# Patient Record
Sex: FEMALE | URBAN - METROPOLITAN AREA
[De-identification: names, ages, dates, MRNs, and addresses within clinical notes are randomized per-mention and may not be internally consistent; named-entity substitution may affect disease eponyms.]

---

## 2018-04-23 ENCOUNTER — APPOINTMENT (RX ONLY)
Dept: URBAN - METROPOLITAN AREA CLINIC 23 | Facility: CLINIC | Age: 58
Setting detail: DERMATOLOGY
End: 2018-04-23

## 2018-04-23 DIAGNOSIS — Z41.9 ENCOUNTER FOR PROCEDURE FOR PURPOSES OTHER THAN REMEDYING HEALTH STATE, UNSPECIFIED: ICD-10-CM

## 2018-04-23 PROCEDURE — ? FILLERS

## 2018-04-23 PROCEDURE — ? DYSPORT

## 2018-04-23 PROCEDURE — ? RECOMMENDATIONS

## 2018-04-23 NOTE — PROCEDURE: DYSPORT
Periorbital Skin Units: 0
Consent: Written consent obtained. Risks include but not limited to lid/brow ptosis, bruising, swelling, diplopia, temporary effect, incomplete chemical denervation.
Additional Area 3 Location: above left lateral brow
Lot #: [id number]
Detail Level: Zone
Additional Area 1 Location: forehead 2 cm above brows
Additional Area 1 Units: [facility]
Additional Area 5 Location: neck bands
Additional Area 6 Location: neck
Post-Care Instructions: Patient instructed to not lie down for 4 hours and limit physical activity for 24 hours. Patient instructed not to travel by airplane for 48 hours.
Additional Area 2 Location: high forehead 2 cm above brows
Dilution (U/ 0.1cc): 1.5
Glabellar Complex Units: 60
Expiration Date (Month Year): 09/30/2018
Price (Use Numbers Only, No Special Characters Or $): 0.00

## 2018-04-23 NOTE — PROCEDURE: FILLERS
Additional Area 3 Volume In Cc: 0
Additional Area 1 Location: using the cannula to bilateral cheeks. With conventional needle to lateral jawline.
Detail Level: Zone
Additional Area 1 Volume In Cc: 1
Anesthesia Type: 1% lidocaine without epinephrine
Map Statment: See [address] for Complete Details
Filler: Restylane-L
Filler: Boyd Shaffer
Anesthesia Volume In Cc: 0.2
Use Map Statement For Sites (Optional): No
Expiration Date (Month Year): 09/2020
Additional Anesthesia Volume In Cc: 6
Post-Care Instructions: Patient instructed to apply ice to reduce swelling.
Price (Use Numbers Only, No Special Characters Or $): 0.00
Topical Anesthesia?: BLT cream (benzocaine 20%, lidocaine 6%, tetracaine 4%)
Lot #: [id number]
Consent: Written consent obtained. Risks include but not limited to bruising, beading, irregular texture, ulceration, infection, allergic reaction, scar formation, incomplete augmentation, temporary nature, procedural pain.
Expiration Date (Month Year): 08/31/2020
Additional Area 1 Location: diluted with 0.2 cc plain lidocaine to perioral fine lines, lateral mouth, marionette lines
Lot #: [id number]

## 2018-04-23 NOTE — PROCEDURE: RECOMMENDATIONS
Detail Level: Zone
Recommendation Preamble: The following recommendations were made during the visit:
Recommendations (Free Text): 2 areas of Dysport; glabella nad eyes, and Restylane x 2 ml for perioral area, cheeks and under eyes.

## 2018-05-01 ENCOUNTER — APPOINTMENT (RX ONLY)
Dept: URBAN - METROPOLITAN AREA CLINIC 23 | Facility: CLINIC | Age: 58
Setting detail: DERMATOLOGY
End: 2018-05-01

## 2018-05-01 DIAGNOSIS — Z41.9 ENCOUNTER FOR PROCEDURE FOR PURPOSES OTHER THAN REMEDYING HEALTH STATE, UNSPECIFIED: ICD-10-CM

## 2018-05-01 PROCEDURE — ? FILLERS

## 2018-05-01 PROCEDURE — ? DYSPORT

## 2018-05-01 NOTE — HPI: COSMETIC FOLLOW UP
How Did You Tolerate The Procedure?: well, without problems
What Condition Are We Treating?: Fillers,patient states she needs more
What Procedure Did We Perform At The Last Visit?: Restylane Lyft and Regular Restylane

## 2018-05-01 NOTE — PROCEDURE: FILLERS
Additional Area 3 Volume In Cc: 0
Anesthesia Type: 1% lidocaine without epinephrine
Expiration Date (Month Year): 09/30/2020
Consent: Written consent obtained. Risks include but not limited to bruising, beading, irregular texture, ulceration, infection, allergic reaction, scar formation, incomplete augmentation, temporary nature, procedural pain.
Map Statment: See [address] for Complete Details
Include Cannula Information In Note?: No
Lot #: [id number]
Additional Anesthesia Volume In Cc: 6
Anesthesia Volume In Cc: 0.2
Price (Use Numbers Only, No Special Characters Or $): 0.00
Detail Level: Zone
Additional Area 1 Volume In Cc: 1
Lot #: [id number]
Additional Area 1 Location: perioral fine lines, marionette lines
Post-Care Instructions: Patient instructed to apply ice to reduce swelling.
Expiration Date (Month Year): 08/2019
Filler: Restylane-L
Additional Area 1 Location: perioral fine lines, lateral mouth, marionette lines

## 2020-09-08 ENCOUNTER — APPOINTMENT (RX ONLY)
Dept: URBAN - METROPOLITAN AREA CLINIC 23 | Facility: CLINIC | Age: 60
Setting detail: DERMATOLOGY
End: 2020-09-08

## 2020-09-08 DIAGNOSIS — Z41.9 ENCOUNTER FOR PROCEDURE FOR PURPOSES OTHER THAN REMEDYING HEALTH STATE, UNSPECIFIED: ICD-10-CM

## 2020-09-08 PROCEDURE — ? RECOMMENDATIONS

## 2020-09-08 PROCEDURE — ? FILLERS

## 2020-09-08 NOTE — PROCEDURE: RECOMMENDATIONS
Recommendations (Free Text): Geraldine Rainey / E-matrix
Detail Level: Detailed
Recommendation Preamble: The following recommendations were made during the visit:

## 2020-09-08 NOTE — PROCEDURE: FILLERS
Marionette Lines Filler  Volume In Cc: 0
Include Cannula Brand?: DermaSculpt
Include Cannula Length?: 1.5 inch
Lot #: [id number]
Additional Area 2 Volume In Cc: 1
Include Cannula Information In Note?: No
Post-Care Instructions: Patient instructed to apply ice to reduce swelling.
Additional Anesthesia Volume In Cc: 6
Additional Area 1 Location: lateral mouth, perioral fine lines, vermillion lips, marionette lines
Expiration Date (Month Year): 08/31/2019
Additional Area 4 Location: lateral jawline, lateral mouth, glabella lines,
Filler: Boyd Shaffer
Additional Area 2 Location: Lips, oral commissure, marionette
Filler: Kraig Loomis
Additional Area 2 Volume In Cc: 0.5
Detail Level: Zone
Additional Area 5 Location: Cheeks, vermillion lips, marionette fine lines, glabellar fine lines, lateral mouth
Additional Area 3 Location: Glabbellar fine lines, Nasalabial folds, Marionette lines, vermillion lip
Additional Area 1 Location: lateral mouth, fine lines perioral, marionette lines, lateral cheeks, vermillion lips
Lot #: [id number]
Price (Use Numbers Only, No Special Characters Or $): 0.00
Map Statment: See [address] for Complete Details
Expiration Date (Month Year): 2022-11
Additional Area 4 Location: Perioral
Additional Area 2 Location: Nasalabial, Glabellar fine lines- Complimentary
Lot #: [id number]
Anesthesia Type: 1% lidocaine without epinephrine
Expiration Date (Month Year): 12/2021
Include Cannula Size?: 25G
Consent: Written consent obtained. Risks include but not limited to bruising, beading, irregular texture, ulceration, infection, allergic reaction, scar formation, incomplete augmentation, temporary nature, procedural pain.
Additional Area 1 Location: cheeks, lateral mouth, nlfs , glabella
Additional Area 2 Location: cheeks, jawline, oral commissure

## 2020-09-29 ENCOUNTER — APPOINTMENT (RX ONLY)
Dept: URBAN - METROPOLITAN AREA CLINIC 23 | Facility: CLINIC | Age: 60
Setting detail: DERMATOLOGY
End: 2020-09-29

## 2020-09-29 VITALS — TEMPERATURE: 97.2 F

## 2020-09-29 DIAGNOSIS — Z41.9 ENCOUNTER FOR PROCEDURE FOR PURPOSES OTHER THAN REMEDYING HEALTH STATE, UNSPECIFIED: ICD-10-CM

## 2020-09-29 PROCEDURE — ? RECOMMENDATIONS

## 2020-09-29 PROCEDURE — ? FILLERS

## 2020-09-29 NOTE — PROCEDURE: FILLERS
Lateral Face Filler  Volume In Cc: 0
Include Cannula Information In Note?: No
Anesthesia Type: 1% lidocaine without epinephrine
Additional Area 1 Location: lateral mouth, perioral fine lines, vermillion lips, marionette lines
Additional Area 1 Location: lateral mouth, fine lines perioral, marionette lines, lateral cheeks, vermillion lips
Additional Anesthesia Volume In Cc: 6
Include Cannula Size?: 25G
Additional Area 2 Location: Lips, oral commissure, glabellar fine fines
Include Cannula Length?: 1.5 inch
Additional Area 2 Location: Nasalabial, Glabellar fine lines- Complimentary
Additional Area 1 Location: lateral cheeks, lateral mouth, nlfs ,
Additional Area 1 Volume In Cc: 0.7
Additional Area 3 Location: Glabbellar fine lines, Nasalabial folds, Marionette lines, vermillion lip
Additional Area 4 Location: Perioral
Additional Area 2 Location: cheeks, jawline, oral commissure
Filler Comments: Physician sample
Detail Level: Zone
Filler: Restylane Kysse
Additional Area 4 Location: lateral jawline, lateral mouth, glabella lines,
Lot #: [id number]
Price (Use Numbers Only, No Special Characters Or $): 0.00
Lot #: [id number]
Additional Area 5 Location: Cheeks, vermillion lips, marionette fine lines, glabellar fine lines, lateral mouth
Map Statment: See [address] for Complete Details
Consent: Written consent obtained. Risks include but not limited to bruising, beading, irregular texture, ulceration, infection, allergic reaction, scar formation, incomplete augmentation, temporary nature, procedural pain.
Expiration Date (Month Year): 09/2021
Post-Care Instructions: Patient instructed to apply ice to reduce swelling.
Lot #: [id number]
Expiration Date (Month Year): 08/31/2019
Expiration Date (Month Year): 12-
Include Cannula Brand?: DermaSculpt

## 2020-11-10 ENCOUNTER — APPOINTMENT (RX ONLY)
Dept: URBAN - METROPOLITAN AREA CLINIC 23 | Facility: CLINIC | Age: 60
Setting detail: DERMATOLOGY
End: 2020-11-10

## 2020-11-10 VITALS — TEMPERATURE: 97.7 F

## 2020-11-10 DIAGNOSIS — Z41.9 ENCOUNTER FOR PROCEDURE FOR PURPOSES OTHER THAN REMEDYING HEALTH STATE, UNSPECIFIED: ICD-10-CM

## 2020-11-10 PROCEDURE — ? FILLERS

## 2020-11-10 NOTE — PROCEDURE: FILLERS
Additional Area 5 Volume In Cc: 0
Expiration Date (Month Year): 09/2021
Include Cannula Information In Note?: No
Consent: Written consent obtained. Risks include but not limited to bruising, beading, irregular texture, ulceration, infection, allergic reaction, scar formation, incomplete augmentation, temporary nature, procedural pain.
Post-Care Instructions: Patient instructed to apply ice to reduce swelling.
Include Cannula Size?: 25G
Include Cannula Brand?: DermaSculpt
Additional Area 1 Location: cheeks, lateral mouth, nlfs , glabella
Additional Area 1 Location: lateral mouth, fine lines perioral, marionette lines, lateral cheeks, vermillion lips
Additional Anesthesia Volume In Cc: 6
Additional Area 2 Location: Nasalabial, Glabellar fine lines- Complimentary
Include Cannula Length?: 1.5 inch
Additional Area 2 Location: lateral cheeks, marionette lines, lateral jawline
Additional Area 1 Location: lateral mouth, perioral fine lines, vermillion lips, marionette lines
Additional Area 2 Location: Lips, oral commissure, glabellar fine fines
Additional Area 2 Volume In Cc: 1
Detail Level: Zone
Filler: Restylane-L
Additional Area 4 Location: lateral jawline, lateral mouth, glabella lines,
Additional Area 3 Location: Glabbellar fine lines, Nasalabial folds, Marionette lines, vermillion lip
Price (Use Numbers Only, No Special Characters Or $): 0.00
Additional Area 5 Location: Cheeks, vermillion lips, marionette fine lines, glabellar fine lines, lateral mouth
Additional Area 4 Location: Perioral
Lot #: [id number]
Map Statment: See [address] for Complete Details
Lot #: [id number]
Expiration Date (Month Year): 08/31/2019
Lot #: [id number]
Anesthesia Type: 1% lidocaine without epinephrine
Expiration Date (Month Year): 2023-01-31

## 2020-12-21 ENCOUNTER — APPOINTMENT (RX ONLY)
Dept: URBAN - METROPOLITAN AREA CLINIC 23 | Facility: CLINIC | Age: 60
Setting detail: DERMATOLOGY
End: 2020-12-21

## 2020-12-21 VITALS — TEMPERATURE: 97.2 F

## 2020-12-21 DIAGNOSIS — Z41.9 ENCOUNTER FOR PROCEDURE FOR PURPOSES OTHER THAN REMEDYING HEALTH STATE, UNSPECIFIED: ICD-10-CM

## 2020-12-21 PROCEDURE — ? FILLERS

## 2020-12-21 NOTE — PROCEDURE: FILLERS
Additional Area 2 Volume In Cc: 0
Additional Area 2 Location: Lips, oral commissure, glabellar fine fines
Map Statment: See [address] for Complete Details
Additional Area 2 Location: Nasalabial, Glabellar fine lines- Complimentary
Consent: Written consent obtained. Risks include but not limited to bruising, beading, irregular texture, ulceration, infection, allergic reaction, scar formation, incomplete augmentation, temporary nature, procedural pain.
Additional Area 3 Location: Glabbellar fine lines, Nasalabial folds, Marionette lines, vermillion lip
Post-Care Instructions: Patient instructed to apply ice to reduce swelling.
Additional Area 4 Location: lateral jawline, lateral mouth, glabella lines,
Anesthesia Type: 1% lidocaine without epinephrine
Additional Area 4 Location: Perioral
Additional Area 5 Location: Cheeks, vermillion lips, marionette fine lines, glabellar fine lines, lateral mouth
Lot #: [id number]
Include Cannula Information In Note?: No
Lot #: [id number]
Additional Anesthesia Volume In Cc: 6
Expiration Date (Month Year): 2023-02-28
Lot #: [id number]
Expiration Date (Month Year): 09/2021
Additional Area 1 Location: lateral cheeks, lateral mouth, marionette lines, vermillion lips
Expiration Date (Month Year): 08/31/2019
Additional Area 1 Volume In Cc: 1
Additional Area 2 Location: cheeks, jawline, oral commissure
Include Cannula Brand?: DermaSculpt
Include Cannula Size?: 25G
Detail Level: Zone
Filler: Restylane-L
Include Cannula Length?: 1.5 inch
Price (Use Numbers Only, No Special Characters Or $): 0.00
Additional Area 1 Location: lateral mouth, perioral fine lines, vermillion lips, marionette lines
Additional Area 1 Location: lateral mouth, fine lines perioral, marionette lines, lateral cheeks, vermillion lips

## 2021-01-26 ENCOUNTER — APPOINTMENT (RX ONLY)
Dept: URBAN - METROPOLITAN AREA CLINIC 23 | Facility: CLINIC | Age: 61
Setting detail: DERMATOLOGY
End: 2021-01-26

## 2021-01-26 DIAGNOSIS — Z02.9 ENCOUNTER FOR ADMINISTRATIVE EXAMINATIONS, UNSPECIFIED: ICD-10-CM

## 2021-01-26 PROCEDURE — ? NO SHOW PLAN

## 2021-02-16 ENCOUNTER — APPOINTMENT (RX ONLY)
Dept: URBAN - METROPOLITAN AREA CLINIC 23 | Facility: CLINIC | Age: 61
Setting detail: DERMATOLOGY
End: 2021-02-16

## 2021-02-16 VITALS — TEMPERATURE: 97.7 F

## 2021-02-16 DIAGNOSIS — Z41.9 ENCOUNTER FOR PROCEDURE FOR PURPOSES OTHER THAN REMEDYING HEALTH STATE, UNSPECIFIED: ICD-10-CM

## 2021-02-16 PROCEDURE — ? FILLERS

## 2021-03-02 ENCOUNTER — APPOINTMENT (RX ONLY)
Dept: URBAN - METROPOLITAN AREA CLINIC 23 | Facility: CLINIC | Age: 61
Setting detail: DERMATOLOGY
End: 2021-03-02

## 2021-03-02 DIAGNOSIS — Z41.9 ENCOUNTER FOR PROCEDURE FOR PURPOSES OTHER THAN REMEDYING HEALTH STATE, UNSPECIFIED: ICD-10-CM

## 2021-03-02 PROCEDURE — ? FILLERS

## 2021-03-02 NOTE — PROCEDURE: FILLERS
Lateral Face Filler  Volume In Cc: 0
Include Cannula Information In Note?: No
Include Cannula Brand?: DermaSculpt
Include Cannula Size?: 25G
Filler: Boyd Shaffer
Additional Area 1 Location: cheeks, lateral mouth, nlfs , glabella
Detail Level: Zone
Additional Area 1 Location: lateral mouth, perioral fine lines, vermillion lips, marionette lines
Include Cannula Length?: 1.5 inch
Price (Use Numbers Only, No Special Characters Or $): 0.00
Additional Area 2 Location: cheeks, jawline, oral commissure
Additional Area 2 Volume In Cc: 1
Additional Area 1 Location: lateral mouth, fine lines perioral, marionette lines, lateral cheeks, vermillion lips
Additional Area 2 Location: Lips, oral commissure, glabellar fine fines
Additional Area 2 Location: Nasalabial, Glabellar fine lines- Complimentary
Map Statment: See [address] for Complete Details
Additional Area 4 Location: lateral jawline, lateral mouth, glabella lines,
Additional Area 3 Location: Glabbellar fine lines, Nasalabial folds, Marionette lines, vermillion lip
Consent: Written consent obtained. Risks include but not limited to bruising, beading, irregular texture, ulceration, infection, allergic reaction, scar formation, incomplete augmentation, temporary nature, procedural pain.
Post-Care Instructions: Patient instructed to apply ice to reduce swelling.
Anesthesia Type: 1% lidocaine without epinephrine
Additional Area 4 Location: Perioral
Additional Area 5 Location: Cheeks, vermillion lips, marionette fine lines, glabellar fine lines, lateral mouth
Lot #: [id number]
Lot #: [id number]
Lot #: [id number]
Expiration Date (Month Year): 2023-08
Additional Anesthesia Volume In Cc: 6
Expiration Date (Month Year): 09/2021
Expiration Date (Month Year): 08/31/2019

## 2021-03-02 NOTE — HPI: COSMETIC (FILLERS)
Have You Had Fillers Before?: has had fillers
Additional History: Pt had fillers 2 weeks ago and says she does not see the difference. Advised her that depending on her goal she might need more product.  Pt has loss of volume in cheek area and wants to âlift her cheeksâ

## 2021-04-21 ENCOUNTER — APPOINTMENT (RX ONLY)
Dept: URBAN - METROPOLITAN AREA CLINIC 23 | Facility: CLINIC | Age: 61
Setting detail: DERMATOLOGY
End: 2021-04-21

## 2021-04-21 VITALS — TEMPERATURE: 97.3 F

## 2021-04-21 DIAGNOSIS — Z41.9 ENCOUNTER FOR PROCEDURE FOR PURPOSES OTHER THAN REMEDYING HEALTH STATE, UNSPECIFIED: ICD-10-CM

## 2021-04-21 PROCEDURE — ? FILLERS

## 2021-04-21 PROCEDURE — ? BOTOX

## 2021-04-21 NOTE — PROCEDURE: BOTOX
Left Periorbital Units: 0
Additional Area 3 Location: high forehead 2 cm above brows
Dilution (U/0.1 Cc): 2.5
Show Anterior Platysmal Band Units: Yes
Show Right And Left Pupillary Line Units: No
Additional Area 1 Location: upper lip - special
Additional Area 6 Location: [facility].
Additional Area 1 Units: 4
Expiration Date (Month Year): 9/23
Detail Level: Zone
Additional Area 2 Location: chin
Price (Use Numbers Only, No Special Characters Or $): 0.00
Lot #: [id number]
Consent: Written consent obtained. Risks include but not limited to lid/brow ptosis, bruising, swelling, diplopia, temporary effect, incomplete chemical denervation.
Additional Area 5 Location: high forehead 1.5cm above brows

## 2021-04-21 NOTE — PROCEDURE: FILLERS
Lateral Face Filler  Volume In Cc: 0
Additional Area 1 Location: lateral mouth, perioral fine lines, vermillion lips, marionette lines
Include Cannula Information In Note?: No
Lot #: [id number]
Additional Area 2 Location: Lips, oral commissure, glabellar fine fines
Anesthesia Type: 1% lidocaine without epinephrine
Additional Area 3 Location: Glabbellar fine lines, Nasalabial folds, Marionette lines, vermillion lip
Additional Area 1 Location: lateral mouth, fine lines perioral, marionette lines, lateral cheeks, vermillion lips
Expiration Date (Month Year): 6017-5-17
Additional Area 4 Location: Perioral
Additional Area 2 Location: Nasalabial, Glabellar fine lines- Complimentary
Additional Anesthesia Volume In Cc: 6
Include Cannula Size?: 25G
Additional Area 1 Location: cheeks, lateral mouth, nlfs, vermillion lip
Additional Area 1 Volume In Cc: 1
Lot #: [id number]
Consent: Written consent obtained. Risks include but not limited to bruising, beading, irregular texture, ulceration, infection, allergic reaction, scar formation, incomplete augmentation, temporary nature, procedural pain.
Additional Area 2 Location: cheeks, jawline, oral commissure
Include Cannula Length?: 1.5 inch
Post-Care Instructions: Patient instructed to apply ice to reduce swelling.
Expiration Date (Month Year): 09/2021
Lot #: [id number]
Detail Level: Zone
Filler: Boyd Shaffer
Price (Use Numbers Only, No Special Characters Or $): 0.00
Additional Area 4 Location: lateral jawline, lateral mouth, glabella lines,
Expiration Date (Month Year): 08/31/2019
Include Cannula Brand?: DermaSculpt
Additional Area 5 Location: Cheeks, vermillion lips, marionette fine lines, glabellar fine lines, lateral mouth
Map Statment: See [address] for Complete Details

## 2021-10-27 ENCOUNTER — APPOINTMENT (RX ONLY)
Dept: URBAN - METROPOLITAN AREA CLINIC 23 | Facility: CLINIC | Age: 61
Setting detail: DERMATOLOGY
End: 2021-10-27

## 2021-10-27 DIAGNOSIS — Z02.9 ENCOUNTER FOR ADMINISTRATIVE EXAMINATIONS, UNSPECIFIED: ICD-10-CM

## 2021-10-27 PROCEDURE — ? NO SHOW PLAN

## 2024-07-30 ENCOUNTER — APPOINTMENT (RX ONLY)
Dept: URBAN - METROPOLITAN AREA CLINIC 23 | Facility: CLINIC | Age: 64
Setting detail: DERMATOLOGY
End: 2024-07-30

## 2024-07-30 VITALS — HEIGHT: 62 IN | WEIGHT: 118 LBS

## 2024-07-30 DIAGNOSIS — L81.4 OTHER MELANIN HYPERPIGMENTATION: ICD-10-CM

## 2024-07-30 DIAGNOSIS — L82.0 INFLAMED SEBORRHEIC KERATOSIS: ICD-10-CM

## 2024-07-30 DIAGNOSIS — D22 MELANOCYTIC NEVI: ICD-10-CM

## 2024-07-30 DIAGNOSIS — Z71.89 OTHER SPECIFIED COUNSELING: ICD-10-CM

## 2024-07-30 PROBLEM — D23.71 OTHER BENIGN NEOPLASM OF SKIN OF RIGHT LOWER LIMB, INCLUDING HIP: Status: ACTIVE | Noted: 2024-07-30

## 2024-07-30 PROBLEM — D22.5 MELANOCYTIC NEVI OF TRUNK: Status: ACTIVE | Noted: 2024-07-30

## 2024-07-30 PROCEDURE — ? PRESCRIPTION

## 2024-07-30 PROCEDURE — 17110 DESTRUCTION B9 LES UP TO 14: CPT

## 2024-07-30 PROCEDURE — 99213 OFFICE O/P EST LOW 20 MIN: CPT | Mod: 25

## 2024-07-30 PROCEDURE — ? COUNSELING

## 2024-07-30 PROCEDURE — ? RECOMMENDATIONS

## 2024-07-30 PROCEDURE — ? LIQUID NITROGEN

## 2024-07-30 RX ORDER — TRETIONIN 0.25 MG/G
CREAM TOPICAL
Qty: 20 | Refills: 3 | Status: ERX | COMMUNITY
Start: 2024-07-30

## 2024-07-30 RX ADMIN — TRETIONIN: 0.25 CREAM TOPICAL at 00:00

## 2024-07-30 ASSESSMENT — LOCATION DETAILED DESCRIPTION DERM
LOCATION DETAILED: SUPERIOR THORACIC SPINE
LOCATION DETAILED: RIGHT DORSAL FOOT
LOCATION DETAILED: INFERIOR THORACIC SPINE

## 2024-07-30 ASSESSMENT — LOCATION SIMPLE DESCRIPTION DERM
LOCATION SIMPLE: RIGHT FOOT
LOCATION SIMPLE: UPPER BACK

## 2024-07-30 ASSESSMENT — LOCATION ZONE DERM
LOCATION ZONE: FEET
LOCATION ZONE: TRUNK

## 2024-07-30 NOTE — PROCEDURE: LIQUID NITROGEN
Show Aperture Variable?: Yes
Detail Level: Detailed
Application Tool (Optional): Liquid Nitrogen Sprayer
Include Z78.9 (Other Specified Conditions Influencing Health Status) As An Associated Diagnosis?: No
Post-Care Instructions: I reviewed with the patient in detail post-care instructions. Patient is to wear sunprotection, and avoid picking at any of the treated lesions. Pt may apply Vaseline to crusted or scabbing areas.
Number Of Freeze-Thaw Cycles: 3 freeze-thaw cycles
Duration Of Freeze Thaw-Cycle (Seconds): 5-10
Spray Paint Text: The liquid nitrogen was applied to the skin utilizing a spray paint frosting technique.
Medical Necessity Clause: This procedure was medically necessary because the lesions that were treated were:
Consent: The patient's consent was obtained including but not limited to risks of crusting, scabbing, blistering, scarring, darker or lighter pigmentary change, recurrence, incomplete removal and infection.
Medical Necessity Information: It is in your best interest to select a reason for this procedure from the list below. All of these items fulfill various CMS LCD requirements except the new and changing color options.

## 2024-07-30 NOTE — PROCEDURE: RECOMMENDATIONS
Render Risk Assessment In Note?: no
Detail Level: Zone
Recommendations (Free Text): Dual Frax for full face, quoted patient $1500 per treatment, recommended 3 treatments
